# Patient Record
Sex: FEMALE | Race: OTHER | NOT HISPANIC OR LATINO | URBAN - METROPOLITAN AREA
[De-identification: names, ages, dates, MRNs, and addresses within clinical notes are randomized per-mention and may not be internally consistent; named-entity substitution may affect disease eponyms.]

---
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
## 2024-01-11 ENCOUNTER — APPOINTMENT (OUTPATIENT)
Dept: PSYCHIATRY | Facility: CLINIC | Age: 19
End: 2024-01-11
Payer: COMMERCIAL

## 2024-01-11 PROCEDURE — 99213 OFFICE O/P EST LOW 20 MIN: CPT | Mod: 95

## 2024-01-11 NOTE — HISTORY OF PRESENT ILLNESS
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 Pt was previously seen by Dr Dooley - last seen 7 months ago.   Since last visit, pts dose of Vyvanse and Adderall  were continued - Lexapro was continued at 15 mg as well. She states she is doing well. 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 She is taking her meds and is very happy with how she is feeling. Anxiety is controlled and focusing is good.   No si/hi, no isa or psychosis.   Risk Assessment: Low risk for suicide/ aggression both acutely and chronically. RISK Factors: none PROTECTIVE Factors: no previous attempt, no access to lethal means/ no access to firearm, no substance abuse, no legal history, no history of aggression, does not present with vindictive intent, no SI/HI, no psychosis, positive therapeutic relationship, engaged in school/work, reality testing intact, good social support, future oriented, no previous suicide attempts or violent history

## 2024-01-11 NOTE — REASON FOR VISIT
[Telehealth (audio & video) - Individual/Group] : This visit was provided via telehealth using real-time 2-way audio visual technology. [Medical Office: ([facility])___] : The provider was located at the medical office in [unfilled]. [Home] : The patient, [unfilled], was located at home, [unfilled], at the time of the visit. [Patient] : Patient [FreeTextEntry1] : adhd/anxiety

## 2024-04-04 ENCOUNTER — APPOINTMENT (OUTPATIENT)
Dept: PSYCHIATRY | Facility: CLINIC | Age: 19
End: 2024-04-04
Payer: COMMERCIAL

## 2024-04-04 PROCEDURE — 99214 OFFICE O/P EST MOD 30 MIN: CPT | Mod: 95

## 2024-04-04 NOTE — REASON FOR VISIT
[Telehealth (audio & video) - Individual/Group] : This visit was provided via telehealth using real-time 2-way audio visual technology. [Medical Office: ([facility])___] : The provider was located at the medical office in [unfilled]. [Home] : The patient, [unfilled], was located at home, [unfilled], at the time of the visit. [Verbal consent obtained from patient/other participant(s)] : Verbal consent for telehealth/telephonic services obtained from patient/other participant(s) [Patient] : Patient [FreeTextEntry1] : adhd/anxiety

## 2024-04-04 NOTE — PLAN
[FreeTextEntry5] : -counseling and support provided, -will continue Vyvanse to 50mg daily and adderall 5mg IR dose in afternoon.-pt is not due for refills -DC lexapro 15mg po daily as pt stopped it on her own.  -er/911 prn -f/u 2-3 months

## 2024-04-04 NOTE — HISTORY OF PRESENT ILLNESS
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 Pt was previously seen by Dr Dooley - last seen 7 months ago.   Since last visit, pts dose of Vyvanse and Adderall  were continued - Lexapro was continued at 15 mg as well. She states she is doing well. She got off of her Lexapro because she didnt like how she was feeling and she states she is now fine without it. Anxiety has not come back. ADHD meds still help, she likes them. No side effects to those medicines.  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   No si/hi, no isa or psychosis.   Risk Assessment: Low risk for suicide/ aggression both acutely and chronically. RISK Factors: none PROTECTIVE Factors: no previous attempt, no access to lethal means/ no access to firearm, no substance abuse, no legal history, no history of aggression, does not present with vindictive intent, no SI/HI, no psychosis, positive therapeutic relationship, engaged in school/work, reality testing intact, good social support, future oriented, no previous suicide attempts or violent history

## 2024-06-04 ENCOUNTER — APPOINTMENT (OUTPATIENT)
Dept: PSYCHIATRY | Facility: CLINIC | Age: 19
End: 2024-06-04
Payer: COMMERCIAL

## 2024-06-04 DIAGNOSIS — F90.2 ATTENTION-DEFICIT HYPERACTIVITY DISORDER, COMBINED TYPE: ICD-10-CM

## 2024-06-04 DIAGNOSIS — F41.9 ANXIETY DISORDER, UNSPECIFIED: ICD-10-CM

## 2024-06-04 PROCEDURE — 99213 OFFICE O/P EST LOW 20 MIN: CPT | Mod: 95

## 2024-06-04 RX ORDER — ESCITALOPRAM OXALATE 10 MG/1
10 TABLET ORAL DAILY
Qty: 30 | Refills: 2 | Status: DISCONTINUED | COMMUNITY
Start: 2023-06-15 | End: 2024-06-04

## 2024-06-04 RX ORDER — DEXTROAMPHETAMINE SACCHARATE, AMPHETAMINE ASPARTATE, DEXTROAMPHETAMINE SULFATE AND AMPHETAMINE SULFATE 1.25; 1.25; 1.25; 1.25 MG/1; MG/1; MG/1; MG/1
5 TABLET ORAL DAILY
Qty: 30 | Refills: 0 | Status: ACTIVE | COMMUNITY
Start: 2022-09-13 | End: 1900-01-01

## 2024-06-04 RX ORDER — LISDEXAMFETAMINE DIMESYLATE 50 MG/1
50 CAPSULE ORAL DAILY
Qty: 30 | Refills: 0 | Status: ACTIVE | COMMUNITY
Start: 2023-09-12 | End: 1900-01-01

## 2024-06-04 RX ORDER — ESCITALOPRAM OXALATE 5 MG/1
5 TABLET ORAL DAILY
Qty: 30 | Refills: 2 | Status: DISCONTINUED | COMMUNITY
Start: 2023-09-20 | End: 2024-06-04

## 2024-06-04 NOTE — HISTORY OF PRESENT ILLNESS
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 Pt was previously seen by Dr Dooley - last seen 7 months ago.   Since last visit, pts dose of Vyvanse and Adderall  were continued - Lexapro was DCed as she stopped it on her own before last visit. She states she has been feeling fine off the Lexapro, no acute symptoms present. The Vyvanse helps her. 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   No si/hi, no isa or psychosis.   Risk Assessment: Low risk for suicide/ aggression both acutely and chronically. RISK Factors: none PROTECTIVE Factors: no previous attempt, no access to lethal means/ no access to firearm, no substance abuse, no legal history, no history of aggression, does not present with vindictive intent, no SI/HI, no psychosis, positive therapeutic relationship, engaged in school/work, reality testing intact, good social support, future oriented, no previous suicide attempts or violent history

## 2024-06-04 NOTE — REASON FOR VISIT
[Telehealth (audio & video) - Individual/Group] : This visit was provided via telehealth using real-time 2-way audio visual technology. [Medical Office: ([facility])___] : The provider was located at the medical office in [unfilled]. [Home] : The patient, [unfilled], was located at home, [unfilled], at the time of the visit. [Verbal consent obtained from patient/other participant(s)] : Verbal consent for telehealth/telephonic services obtained from patient/other participant(s) [Patient] : Patient [FreeTextEntry1] : adhd

## 2024-06-04 NOTE — PLAN
[FreeTextEntry5] : -counseling and support provided, -will continue Vyvanse to 50mg daily and adderall 5mg IR dose in afternoon.-pt is not due for refills -er/911 prn -f/u 3 months

## 2024-08-22 ENCOUNTER — APPOINTMENT (OUTPATIENT)
Dept: PSYCHIATRY | Facility: CLINIC | Age: 19
End: 2024-08-22
Payer: COMMERCIAL

## 2024-08-22 DIAGNOSIS — F90.2 ATTENTION-DEFICIT HYPERACTIVITY DISORDER, COMBINED TYPE: ICD-10-CM

## 2024-08-22 DIAGNOSIS — F41.9 ANXIETY DISORDER, UNSPECIFIED: ICD-10-CM

## 2024-08-22 PROCEDURE — 99213 OFFICE O/P EST LOW 20 MIN: CPT | Mod: 95

## 2024-08-22 NOTE — REASON FOR VISIT
[Telehealth (audio & video) - Individual/Group] : This visit was provided via telehealth using real-time 2-way audio visual technology. [Medical Office: ([facility])___] : The provider was located at the medical office in [unfilled]. [Home] : The patient, [unfilled], was located at home, [unfilled], at the time of the visit. [Verbal consent obtained from patient/other participant(s)] : Verbal consent for telehealth/telephonic services obtained from patient/other participant(s) [Patient] : Patient [FreeTextEntry1] : adhd

## 2024-08-22 NOTE — HISTORY OF PRESENT ILLNESS
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 Pt was previously seen by Dr Dooley - last seen 7 months ago.   Since last visit, pts dose of Vyvanse and Adderall  were continued. She states she just finished work. She is going back to school this weekend - she is taking difficult classes.   No si/hi, no isa or psychosis.   Risk Assessment: Low risk for suicide/ aggression both acutely and chronically. RISK Factors: none PROTECTIVE Factors: no previous attempt, no access to lethal means/ no access to firearm, no substance abuse, no legal history, no history of aggression, does not present with vindictive intent, no SI/HI, no psychosis, positive therapeutic relationship, engaged in school/work, reality testing intact, good social support, future oriented, no previous suicide attempts or violent history

## 2024-12-17 ENCOUNTER — APPOINTMENT (OUTPATIENT)
Dept: PSYCHIATRY | Facility: CLINIC | Age: 19
End: 2024-12-17
Payer: COMMERCIAL

## 2024-12-17 DIAGNOSIS — F90.2 ATTENTION-DEFICIT HYPERACTIVITY DISORDER, COMBINED TYPE: ICD-10-CM

## 2024-12-17 DIAGNOSIS — F41.9 ANXIETY DISORDER, UNSPECIFIED: ICD-10-CM

## 2024-12-17 PROCEDURE — 99214 OFFICE O/P EST MOD 30 MIN: CPT | Mod: 95

## 2025-03-25 ENCOUNTER — APPOINTMENT (OUTPATIENT)
Dept: PSYCHIATRY | Facility: CLINIC | Age: 20
End: 2025-03-25

## 2025-04-08 ENCOUNTER — APPOINTMENT (OUTPATIENT)
Dept: PSYCHIATRY | Facility: CLINIC | Age: 20
End: 2025-04-08
Payer: COMMERCIAL

## 2025-04-08 DIAGNOSIS — F90.2 ATTENTION-DEFICIT HYPERACTIVITY DISORDER, COMBINED TYPE: ICD-10-CM

## 2025-04-08 DIAGNOSIS — F41.9 ANXIETY DISORDER, UNSPECIFIED: ICD-10-CM

## 2025-04-08 PROCEDURE — 99213 OFFICE O/P EST LOW 20 MIN: CPT | Mod: 95

## 2025-07-22 ENCOUNTER — APPOINTMENT (OUTPATIENT)
Dept: PSYCHIATRY | Facility: CLINIC | Age: 20
End: 2025-07-22
Payer: SELF-PAY

## 2025-07-22 DIAGNOSIS — F41.9 ANXIETY DISORDER, UNSPECIFIED: ICD-10-CM

## 2025-07-22 DIAGNOSIS — F90.2 ATTENTION-DEFICIT HYPERACTIVITY DISORDER, COMBINED TYPE: ICD-10-CM

## 2025-07-22 PROCEDURE — 99214 OFFICE O/P EST MOD 30 MIN: CPT | Mod: 95

## 2025-07-22 PROCEDURE — G2211 COMPLEX E/M VISIT ADD ON: CPT | Mod: 95
